# Patient Record
Sex: FEMALE | Race: WHITE
[De-identification: names, ages, dates, MRNs, and addresses within clinical notes are randomized per-mention and may not be internally consistent; named-entity substitution may affect disease eponyms.]

---

## 2019-01-17 ENCOUNTER — HOSPITAL ENCOUNTER (OUTPATIENT)
Dept: HOSPITAL 61 - PCVCIMAG | Age: 64
Discharge: HOME | End: 2019-01-17
Attending: INTERNAL MEDICINE
Payer: COMMERCIAL

## 2019-01-17 DIAGNOSIS — R60.0: ICD-10-CM

## 2019-01-17 DIAGNOSIS — R06.00: Primary | ICD-10-CM

## 2019-01-17 DIAGNOSIS — E78.5: ICD-10-CM

## 2019-01-17 DIAGNOSIS — R07.9: ICD-10-CM

## 2019-01-17 PROCEDURE — 93306 TTE W/DOPPLER COMPLETE: CPT

## 2019-01-17 NOTE — PCVCIMAG
--------------- APPROVED REPORT --------------





Study performed:  01/17/2019 13:15:10



EXAM: Comprehensive 2D, Doppler, and color-flow 

Echocardiogram

Patient Location: Echo lab

Room #:  2Status:  routine



BSA:         1.99

HR: 68 bpmBP:          152/86 mmHg

Rhythm: NSR



Other Information 

Study Quality: Adequate



Risk Factors: 

Cardiac Risk Factors:  Hyperlipidemia



Indications

Dyspnea 

Peripheral Edema

Chest Pain



2D Dimensions

IVSd:  9.42 (7-11mm)LVOT Diam:  18.82 (18-24mm) 

LVDd:  46.78 mm

PWd:  10.13 (7-11mm)Ascending Ao:  27.38 (22-36mm)

LVDs:  27.26 (25-40mm)

Left Atrium:  38.65 (27-40mm)

Aortic Root:  23.50 mm

LV Single Plane 4CH:  68.99 %

LV Single Plane 2CH:  54.25 %

Biplane EF:  60.9 %



Volumes

Left Atrial Volume (Systole)

Single Plane 4CH:  41.09 mLSingle Plane 2CH:  45.78 mL

Biplane LA Volume:  44.00 mLLA ESV Index:  22.00 mL/m2



Aortic Valve

AoV Peak Lb.:  1.91 m/s

AO Peak Gr.:  14.65 mmHgLVOT Max PG:  3.19 mmHg

LVOT Max V:  0.89 m/s

DAYA Vmax: 1.30 cm2



Mitral Valve

E/A Ratio:  1.1

MV Decel. Time:  153.22 ms

MV E Max Lb.:  0.81 m/s

MV A Lb.:  0.77 m/s

IVRT:  72.66 ms



TDI

E/Lateral E':  11.57E/Medial E':  10.13

Medial E' Lb.:  0.08 m/s

Lateral E' Lb.:  0.07 m/s



Pulmonary Valve

PV Peak Lb.:  1.34 m/sPV Peak Gr.:  7.23 mmHg



Pulmonary Vein

P Vein S:    0.82 m/sP Vein A:  0.40 m/s

P Vein D:   0.65 m/sP Vein A Dur.:  62.3 msec

P Vein S/D Ratio:  1.26



Tricuspid Valve

TR Peak Lb.:  2.65 m/s

TR Peak Gr.:  28.05 mmHg

TV Vmax:  0.73 m/sPA Pressure:  35.00 mmHg



Left Ventricle

The left ventricle is normal size. There is normal LV segmental wall 

motion. There is normal left ventricular wall thickness. Left 

ventricular systolic function is normal. The left ventricular 

ejection fraction is within the normal range. LVEF is 60-65%.



Right Ventricle

The right ventricle is normal size. The right ventricular systolic 

function is normal.



Atria

The left atrium size is normal. The right atrium size is 

normal.



Aortic Valve

Aortic valve is not well visualized. Aortic valve is trileaflet. No 

aortic regurgitation is present. There is no aortic valvular 

stenosis.



Mitral Valve

The mitral valve is normal in structure. There is no mitral valve 

regurgitation noted. No evidence of mitral valve stenosis.



Tricuspid Valve

The tricuspid valve is normal in structure. Trace to mild tricuspid 

regurgitation with a PA pressure of 35 mmHg. Mild pulmonary 

hypertension.



Pulmonic Valve

The pulmonary valve is normal in structure. Trace pulmonic 

regurgitation.



Great Vessels

The aortic root is normal in size. The ascending aorta is normal in 

size. Aortic arch is normal in caliber. IVC is normal in size and 

collapses >50% with inspiration.



Pericardium

There is no pericardial effusion. There is no pleural 

effusion.



<Conclusion>

The left ventricle is normal size.

There is normal left ventricular wall thickness.

Left ventricular systolic function is normal.

The right ventricle is normal size.

The left atrium size is normal.

Aortic valve is trileaflet.

There is no mitral valve regurgitation noted.

Trace to mild tricuspid regurgitation with a PA pressure of 35 mmHg.

## 2019-03-22 ENCOUNTER — HOSPITAL ENCOUNTER (OUTPATIENT)
Dept: HOSPITAL 35 - CAT | Age: 64
End: 2019-03-22
Attending: INTERNAL MEDICINE
Payer: COMMERCIAL

## 2019-03-22 DIAGNOSIS — E78.00: ICD-10-CM

## 2019-03-22 DIAGNOSIS — Z13.6: Primary | ICD-10-CM

## 2019-03-22 DIAGNOSIS — I25.10: ICD-10-CM

## 2021-01-25 ENCOUNTER — HOSPITAL ENCOUNTER (OUTPATIENT)
Dept: HOSPITAL 35 - SJCVCIMAG | Age: 66
End: 2021-01-25
Attending: INTERNAL MEDICINE
Payer: COMMERCIAL

## 2021-01-25 DIAGNOSIS — I07.1: Primary | ICD-10-CM

## 2021-01-25 DIAGNOSIS — I25.10: ICD-10-CM

## 2022-01-26 ENCOUNTER — HOSPITAL ENCOUNTER (OUTPATIENT)
Dept: HOSPITAL 35 - SJCVC | Age: 67
End: 2022-01-26
Attending: INTERNAL MEDICINE
Payer: COMMERCIAL

## 2022-01-26 DIAGNOSIS — I49.8: Primary | ICD-10-CM

## 2022-01-26 DIAGNOSIS — R60.9: ICD-10-CM

## 2022-01-26 DIAGNOSIS — R93.1: ICD-10-CM

## 2022-01-26 DIAGNOSIS — J45.909: ICD-10-CM

## 2022-01-26 DIAGNOSIS — Z88.8: ICD-10-CM

## 2022-01-26 DIAGNOSIS — I25.10: ICD-10-CM

## 2022-01-26 DIAGNOSIS — K21.9: ICD-10-CM

## 2022-01-26 DIAGNOSIS — I10: ICD-10-CM

## 2022-01-26 DIAGNOSIS — E78.5: ICD-10-CM

## 2022-01-26 DIAGNOSIS — Z79.899: ICD-10-CM

## 2022-01-26 DIAGNOSIS — Z72.89: ICD-10-CM

## 2022-01-26 DIAGNOSIS — Z87.891: ICD-10-CM

## 2022-01-26 DIAGNOSIS — E78.00: ICD-10-CM
